# Patient Record
Sex: FEMALE | Race: BLACK OR AFRICAN AMERICAN | Employment: PART TIME | URBAN - METROPOLITAN AREA
[De-identification: names, ages, dates, MRNs, and addresses within clinical notes are randomized per-mention and may not be internally consistent; named-entity substitution may affect disease eponyms.]

---

## 2018-01-01 ENCOUNTER — HOSPITAL ENCOUNTER (EMERGENCY)
Age: 33
Discharge: HOME | End: 2018-12-26
Attending: EMERGENCY MEDICINE
Payer: COMMERCIAL

## 2018-01-01 ENCOUNTER — APPOINTMENT (OUTPATIENT)
Dept: GENERAL RADIOLOGY | Age: 33
End: 2018-01-01
Payer: COMMERCIAL

## 2018-01-01 VITALS
DIASTOLIC BLOOD PRESSURE: 98 MMHG | TEMPERATURE: 100.7 F | HEIGHT: 61 IN | SYSTOLIC BLOOD PRESSURE: 156 MMHG | RESPIRATION RATE: 20 BRPM | HEART RATE: 103 BPM | OXYGEN SATURATION: 99 % | BODY MASS INDEX: 33.61 KG/M2 | WEIGHT: 178 LBS

## 2018-01-01 DIAGNOSIS — J02.0 STREP PHARYNGITIS: Primary | ICD-10-CM

## 2018-01-01 DIAGNOSIS — J10.1 INFLUENZA A: ICD-10-CM

## 2018-01-01 LAB
ALBUMIN SERPL-MCNC: 4.2 G/DL (ref 3.5–5.2)
ALP BLD-CCNC: 80 U/L (ref 35–104)
ALT SERPL-CCNC: 13 U/L (ref 0–32)
ANION GAP SERPL CALCULATED.3IONS-SCNC: 16 MMOL/L (ref 7–16)
AST SERPL-CCNC: 18 U/L (ref 0–31)
BACTERIA: ABNORMAL /HPF
BASOPHILS ABSOLUTE: 0.01 E9/L (ref 0–0.2)
BASOPHILS RELATIVE PERCENT: 0.2 % (ref 0–2)
BILIRUB SERPL-MCNC: 0.2 MG/DL (ref 0–1.2)
BILIRUBIN URINE: ABNORMAL
BLOOD, URINE: ABNORMAL
BUN BLDV-MCNC: 6 MG/DL (ref 6–20)
CALCIUM SERPL-MCNC: 8.7 MG/DL (ref 8.6–10.2)
CHLORIDE BLD-SCNC: 94 MMOL/L (ref 98–107)
CHP ED QC CHECK: YES
CLARITY: CLEAR
CO2: 23 MMOL/L (ref 22–29)
COLOR: ABNORMAL
CREAT SERPL-MCNC: 0.7 MG/DL (ref 0.5–1)
EOSINOPHILS ABSOLUTE: 0 E9/L (ref 0.05–0.5)
EOSINOPHILS RELATIVE PERCENT: 0 % (ref 0–6)
EPITHELIAL CELLS, UA: ABNORMAL /HPF
GFR AFRICAN AMERICAN: >60
GFR NON-AFRICAN AMERICAN: >60 ML/MIN/1.73
GLUCOSE BLD-MCNC: 74 MG/DL (ref 74–99)
GLUCOSE URINE: NEGATIVE MG/DL
HCT VFR BLD CALC: 39.2 % (ref 34–48)
HEMOGLOBIN: 13.4 G/DL (ref 11.5–15.5)
IMMATURE GRANULOCYTES #: 0.02 E9/L
IMMATURE GRANULOCYTES %: 0.4 % (ref 0–5)
INFLUENZA A BY PCR: DETECTED
INFLUENZA B BY PCR: NOT DETECTED
KETONES, URINE: 40 MG/DL
LACTIC ACID: 1.2 MMOL/L (ref 0.5–2.2)
LEUKOCYTE ESTERASE, URINE: NEGATIVE
LYMPHOCYTES ABSOLUTE: 0.57 E9/L (ref 1.5–4)
LYMPHOCYTES RELATIVE PERCENT: 10.3 % (ref 20–42)
MCH RBC QN AUTO: 32.8 PG (ref 26–35)
MCHC RBC AUTO-ENTMCNC: 34.2 % (ref 32–34.5)
MCV RBC AUTO: 95.8 FL (ref 80–99.9)
MONOCYTES ABSOLUTE: 0.56 E9/L (ref 0.1–0.95)
MONOCYTES RELATIVE PERCENT: 10.1 % (ref 2–12)
NEUTROPHILS ABSOLUTE: 4.37 E9/L (ref 1.8–7.3)
NEUTROPHILS RELATIVE PERCENT: 79 % (ref 43–80)
NITRITE, URINE: NEGATIVE
PDW BLD-RTO: 13.2 FL (ref 11.5–15)
PH UA: 6.5 (ref 5–9)
PLATELET # BLD: 217 E9/L (ref 130–450)
PMV BLD AUTO: 10.9 FL (ref 7–12)
POTASSIUM SERPL-SCNC: 3.6 MMOL/L (ref 3.5–5)
PREGNANCY TEST URINE, POC: NEGATIVE
PROTEIN UA: ABNORMAL MG/DL
RBC # BLD: 4.09 E12/L (ref 3.5–5.5)
RBC # BLD: NORMAL 10*6/UL
RBC UA: ABNORMAL /HPF (ref 0–2)
SODIUM BLD-SCNC: 133 MMOL/L (ref 132–146)
SPECIFIC GRAVITY UA: >=1.03 (ref 1–1.03)
STREP GRP A PCR: POSITIVE
TOTAL PROTEIN: 7.8 G/DL (ref 6.4–8.3)
UROBILINOGEN, URINE: 1 E.U./DL
WBC # BLD: 5.5 E9/L (ref 4.5–11.5)
WBC UA: ABNORMAL /HPF (ref 0–5)

## 2018-01-01 PROCEDURE — 6370000000 HC RX 637 (ALT 250 FOR IP): Performed by: EMERGENCY MEDICINE

## 2018-01-01 PROCEDURE — 36415 COLL VENOUS BLD VENIPUNCTURE: CPT

## 2018-01-01 PROCEDURE — 6360000002 HC RX W HCPCS: Performed by: EMERGENCY MEDICINE

## 2018-01-01 PROCEDURE — 83605 ASSAY OF LACTIC ACID: CPT

## 2018-01-01 PROCEDURE — 96372 THER/PROPH/DIAG INJ SC/IM: CPT

## 2018-01-01 PROCEDURE — 87880 STREP A ASSAY W/OPTIC: CPT

## 2018-01-01 PROCEDURE — 80053 COMPREHEN METABOLIC PANEL: CPT

## 2018-01-01 PROCEDURE — 96374 THER/PROPH/DIAG INJ IV PUSH: CPT

## 2018-01-01 PROCEDURE — 85025 COMPLETE CBC W/AUTO DIFF WBC: CPT

## 2018-01-01 PROCEDURE — 87502 INFLUENZA DNA AMP PROBE: CPT

## 2018-01-01 PROCEDURE — 71046 X-RAY EXAM CHEST 2 VIEWS: CPT

## 2018-01-01 PROCEDURE — 99283 EMERGENCY DEPT VISIT LOW MDM: CPT

## 2018-01-01 PROCEDURE — 2580000003 HC RX 258: Performed by: EMERGENCY MEDICINE

## 2018-01-01 PROCEDURE — 81001 URINALYSIS AUTO W/SCOPE: CPT

## 2018-01-01 RX ORDER — 0.9 % SODIUM CHLORIDE 0.9 %
1000 INTRAVENOUS SOLUTION INTRAVENOUS ONCE
Status: COMPLETED | OUTPATIENT
Start: 2018-01-01 | End: 2018-01-01

## 2018-01-01 RX ORDER — ACETAMINOPHEN 500 MG
1000 TABLET ORAL ONCE
Status: COMPLETED | OUTPATIENT
Start: 2018-01-01 | End: 2018-01-01

## 2018-01-01 RX ORDER — OSELTAMIVIR PHOSPHATE 75 MG/1
75 CAPSULE ORAL 2 TIMES DAILY
Qty: 20 CAPSULE | Refills: 0 | Status: SHIPPED | OUTPATIENT
Start: 2018-01-01 | End: 2019-01-01

## 2018-01-01 RX ORDER — ONDANSETRON 2 MG/ML
4 INJECTION INTRAMUSCULAR; INTRAVENOUS ONCE
Status: COMPLETED | OUTPATIENT
Start: 2018-01-01 | End: 2018-01-01

## 2018-01-01 RX ADMIN — ONDANSETRON 4 MG: 2 INJECTION INTRAMUSCULAR; INTRAVENOUS at 14:05

## 2018-01-01 RX ADMIN — SODIUM CHLORIDE 1000 ML: 9 INJECTION, SOLUTION INTRAVENOUS at 14:05

## 2018-01-01 RX ADMIN — ACETAMINOPHEN 1000 MG: 500 TABLET ORAL at 14:05

## 2018-01-01 RX ADMIN — PENICILLIN G BENZATHINE 1.2 MILLION UNITS: 1200000 INJECTION, SUSPENSION INTRAMUSCULAR at 16:13

## 2018-01-01 ASSESSMENT — PAIN DESCRIPTION - LOCATION: LOCATION: ABDOMEN

## 2018-01-01 ASSESSMENT — PAIN SCALES - GENERAL
PAINLEVEL_OUTOF10: 8
PAINLEVEL_OUTOF10: 8

## 2018-01-01 ASSESSMENT — ENCOUNTER SYMPTOMS
BLOOD IN STOOL: 0
BACK PAIN: 0
ABDOMINAL PAIN: 1
SHORTNESS OF BREATH: 0
VOMITING: 1
COUGH: 1
NAUSEA: 1
DIARRHEA: 1

## 2018-01-01 ASSESSMENT — PAIN DESCRIPTION - ORIENTATION: ORIENTATION: RIGHT;LEFT;MID;LOWER

## 2018-01-01 ASSESSMENT — PAIN DESCRIPTION - DESCRIPTORS: DESCRIPTORS: SHARP

## 2018-01-01 ASSESSMENT — PAIN DESCRIPTION - PAIN TYPE: TYPE: ACUTE PAIN

## 2018-01-01 ASSESSMENT — PAIN DESCRIPTION - FREQUENCY: FREQUENCY: INTERMITTENT

## 2018-06-05 ENCOUNTER — HOSPITAL ENCOUNTER (EMERGENCY)
Age: 33
Discharge: HOME | End: 2018-06-05
Payer: COMMERCIAL

## 2018-06-05 VITALS
SYSTOLIC BLOOD PRESSURE: 136 MMHG | BODY MASS INDEX: 29.66 KG/M2 | HEART RATE: 100 BPM | RESPIRATION RATE: 16 BRPM | DIASTOLIC BLOOD PRESSURE: 95 MMHG | OXYGEN SATURATION: 99 % | WEIGHT: 178 LBS | TEMPERATURE: 97.3 F | HEIGHT: 65 IN

## 2018-06-05 DIAGNOSIS — L02.91 ABSCESS: Primary | ICD-10-CM

## 2018-06-05 PROCEDURE — 90471 IMMUNIZATION ADMIN: CPT | Performed by: NURSE PRACTITIONER

## 2018-06-05 PROCEDURE — 10060 I&D ABSCESS SIMPLE/SINGLE: CPT

## 2018-06-05 PROCEDURE — 6360000002 HC RX W HCPCS: Performed by: NURSE PRACTITIONER

## 2018-06-05 PROCEDURE — 6370000000 HC RX 637 (ALT 250 FOR IP): Performed by: NURSE PRACTITIONER

## 2018-06-05 PROCEDURE — 90715 TDAP VACCINE 7 YRS/> IM: CPT | Performed by: NURSE PRACTITIONER

## 2018-06-05 PROCEDURE — 99282 EMERGENCY DEPT VISIT SF MDM: CPT

## 2018-06-05 RX ORDER — CEPHALEXIN 500 MG/1
500 CAPSULE ORAL ONCE
Status: COMPLETED | OUTPATIENT
Start: 2018-06-05 | End: 2018-06-05

## 2018-06-05 RX ORDER — CEPHALEXIN 500 MG/1
500 CAPSULE ORAL 4 TIMES DAILY
Qty: 40 CAPSULE | Refills: 0 | Status: SHIPPED | OUTPATIENT
Start: 2018-06-05 | End: 2019-01-01 | Stop reason: ALTCHOICE

## 2018-06-05 RX ORDER — OXYCODONE HYDROCHLORIDE AND ACETAMINOPHEN 5; 325 MG/1; MG/1
1 TABLET ORAL ONCE
Status: COMPLETED | OUTPATIENT
Start: 2018-06-05 | End: 2018-06-05

## 2018-06-05 RX ADMIN — CEPHALEXIN 500 MG: 500 CAPSULE ORAL at 18:11

## 2018-06-05 RX ADMIN — OXYCODONE HYDROCHLORIDE AND ACETAMINOPHEN 1 TABLET: 5; 325 TABLET ORAL at 18:11

## 2018-06-05 RX ADMIN — TETANUS TOXOID, REDUCED DIPHTHERIA TOXOID AND ACELLULAR PERTUSSIS VACCINE, ADSORBED 0.5 ML: 5; 2.5; 8; 8; 2.5 SUSPENSION INTRAMUSCULAR at 18:43

## 2018-06-05 ASSESSMENT — PAIN SCALES - GENERAL: PAINLEVEL_OUTOF10: 8

## 2018-08-07 ENCOUNTER — HOSPITAL ENCOUNTER (OUTPATIENT)
Age: 33
Discharge: HOME | End: 2018-08-09
Payer: COMMERCIAL

## 2018-08-07 ENCOUNTER — TELEPHONE (OUTPATIENT)
Dept: OBGYN | Age: 33
End: 2018-08-07

## 2018-08-07 ENCOUNTER — OFFICE VISIT (OUTPATIENT)
Dept: OBGYN | Age: 33
End: 2018-08-07
Payer: COMMERCIAL

## 2018-08-07 VITALS
BODY MASS INDEX: 34.55 KG/M2 | HEIGHT: 61 IN | DIASTOLIC BLOOD PRESSURE: 97 MMHG | HEART RATE: 82 BPM | WEIGHT: 183 LBS | RESPIRATION RATE: 14 BRPM | SYSTOLIC BLOOD PRESSURE: 145 MMHG | TEMPERATURE: 99.4 F

## 2018-08-07 DIAGNOSIS — I10 ESSENTIAL HYPERTENSION: ICD-10-CM

## 2018-08-07 DIAGNOSIS — Z01.419 WOMEN'S ANNUAL ROUTINE GYNECOLOGICAL EXAMINATION: Primary | ICD-10-CM

## 2018-08-07 LAB
CLUE CELLS: NORMAL
CONTROL: NORMAL
CONTROL: NORMAL
PREGNANCY TEST URINE, POC: NEGATIVE
PREGNANCY TEST URINE, POC: NORMAL
SOURCE WET PREP: NORMAL
TRICHOMONAS PREP: NORMAL
YEAST WET PREP: NORMAL

## 2018-08-07 PROCEDURE — 87491 CHLMYD TRACH DNA AMP PROBE: CPT

## 2018-08-07 PROCEDURE — 4004F PT TOBACCO SCREEN RCVD TLK: CPT | Performed by: NURSE PRACTITIONER

## 2018-08-07 PROCEDURE — 87210 SMEAR WET MOUNT SALINE/INK: CPT

## 2018-08-07 PROCEDURE — 99395 PREV VISIT EST AGE 18-39: CPT | Performed by: NURSE PRACTITIONER

## 2018-08-07 PROCEDURE — G8427 DOCREV CUR MEDS BY ELIG CLIN: HCPCS | Performed by: NURSE PRACTITIONER

## 2018-08-07 PROCEDURE — 81025 URINE PREGNANCY TEST: CPT | Performed by: NURSE PRACTITIONER

## 2018-08-07 PROCEDURE — 87591 N.GONORRHOEAE DNA AMP PROB: CPT

## 2018-08-07 PROCEDURE — G8417 CALC BMI ABV UP PARAM F/U: HCPCS | Performed by: NURSE PRACTITIONER

## 2018-08-07 PROCEDURE — 99203 OFFICE O/P NEW LOW 30 MIN: CPT | Performed by: NURSE PRACTITIONER

## 2018-08-07 ASSESSMENT — ENCOUNTER SYMPTOMS
RESPIRATORY NEGATIVE: 1
ABDOMINAL PAIN: 0
ALLERGIC/IMMUNOLOGIC NEGATIVE: 1
ABDOMINAL DISTENTION: 1

## 2018-08-07 NOTE — TELEPHONE ENCOUNTER
Call to remind patient to get lab work done to confirm pregnancy . Call woman's care when it is done so results can be followed up. Also need to make appointment in the medical clinic.

## 2018-08-07 NOTE — PROGRESS NOTES
Examined by Job Bazan CNP--Pelvic exam done=--Cultures obtained--identified and prepared for transportation to the laboratory--Tolerated well--Advised the need for IM Clinic referral as blood pressure is slightly elevated--To maintain well woman healht

## 2018-08-10 LAB
CHLAMYDIA TRACHOMATIS AMPLIFIED DET: NORMAL
N GONORRHOEAE AMPLIFIED DET: NORMAL

## 2018-08-21 ENCOUNTER — TELEPHONE (OUTPATIENT)
Dept: OBGYN | Age: 33
End: 2018-08-21

## 2018-12-26 NOTE — ED PROVIDER NOTES
Riddhi Galaviz is a 28 y.o female who presents to the ED with a complaint of cough. Patient states for the past 3 days she has been having a productive cough, congestion. She states she has also been having abdominal pain, nausea, vomiting and diarrhea which started around the same time. She states she only has abdominal pain when she has been coughing for a prolonged period of time and describes it as an aching sensation. She states that her niece has similar symptoms and she was admitted yesterday for pneumonia. She has been feeling feverish and chills. She denies any dysuria, urinary frequency, chest pain or shortness of breath. She states that her throat has also been sore but has not had any difficulty swallowing. Cough   Cough characteristics:  Productive  Sputum characteristics:  Nondescript  Severity:  Moderate  Onset quality:  Gradual  Duration:  3 days  Timing:  Intermittent  Progression:  Unchanged  Chronicity:  New  Relieved by:  Nothing  Worsened by:  Nothing  Associated symptoms: chills and fever    Associated symptoms: no chest pain, no headaches, no rash and no shortness of breath        Review of Systems   Constitutional: Positive for chills and fever. HENT: Positive for congestion. Respiratory: Positive for cough. Negative for shortness of breath. Cardiovascular: Negative for chest pain. Gastrointestinal: Positive for abdominal pain, diarrhea, nausea and vomiting. Negative for blood in stool. Genitourinary: Negative for dysuria and frequency. Musculoskeletal: Negative for back pain. Skin: Negative for rash. Neurological: Negative for weakness and headaches. All other systems reviewed and are negative. Physical Exam   Constitutional: She is oriented to person, place, and time. She appears well-developed and well-nourished. HENT:   Head: Normocephalic and atraumatic.    Oral pharyngeal erythema, mild edema, uvula is midline with no evidence of peritonsillar abscess

## 2019-01-01 ENCOUNTER — OFFICE VISIT (OUTPATIENT)
Dept: INTERNAL MEDICINE | Age: 34
End: 2019-01-01
Payer: COMMERCIAL

## 2019-01-01 ENCOUNTER — OFFICE VISIT (OUTPATIENT)
Dept: OBGYN | Age: 34
End: 2019-01-01
Payer: COMMERCIAL

## 2019-01-01 ENCOUNTER — HOSPITAL ENCOUNTER (OUTPATIENT)
Dept: ULTRASOUND IMAGING | Age: 34
Discharge: HOME | End: 2019-04-16
Payer: COMMERCIAL

## 2019-01-01 ENCOUNTER — HOSPITAL ENCOUNTER (OUTPATIENT)
Age: 34
Discharge: HOME | End: 2019-04-11
Payer: COMMERCIAL

## 2019-01-01 ENCOUNTER — HOSPITAL ENCOUNTER (EMERGENCY)
Age: 34
End: 2019-11-02
Attending: EMERGENCY MEDICINE

## 2019-01-01 ENCOUNTER — TELEPHONE (OUTPATIENT)
Dept: INTERNAL MEDICINE | Age: 34
End: 2019-01-01

## 2019-01-01 VITALS
HEART RATE: 96 BPM | HEIGHT: 61 IN | BODY MASS INDEX: 38.33 KG/M2 | SYSTOLIC BLOOD PRESSURE: 127 MMHG | RESPIRATION RATE: 14 BRPM | WEIGHT: 203 LBS | TEMPERATURE: 98 F | DIASTOLIC BLOOD PRESSURE: 81 MMHG

## 2019-01-01 VITALS
WEIGHT: 201 LBS | SYSTOLIC BLOOD PRESSURE: 136 MMHG | OXYGEN SATURATION: 100 % | HEART RATE: 90 BPM | DIASTOLIC BLOOD PRESSURE: 83 MMHG | HEIGHT: 61 IN | RESPIRATION RATE: 17 BRPM | BODY MASS INDEX: 37.95 KG/M2

## 2019-01-01 VITALS
HEART RATE: 87 BPM | TEMPERATURE: 97.9 F | BODY MASS INDEX: 36.99 KG/M2 | WEIGHT: 195.9 LBS | HEIGHT: 61 IN | SYSTOLIC BLOOD PRESSURE: 133 MMHG | RESPIRATION RATE: 16 BRPM | DIASTOLIC BLOOD PRESSURE: 80 MMHG

## 2019-01-01 DIAGNOSIS — F31.30 BIPOLAR AFFECTIVE DISORDER, CURRENT EPISODE DEPRESSED, CURRENT EPISODE SEVERITY UNSPECIFIED (HCC): ICD-10-CM

## 2019-01-01 DIAGNOSIS — Z20.2 POSSIBLE EXPOSURE TO STD: ICD-10-CM

## 2019-01-01 DIAGNOSIS — R60.0 EDEMA OF BOTH LOWER EXTREMITIES: ICD-10-CM

## 2019-01-01 DIAGNOSIS — M79.89 SWELLING OF BOTH LOWER EXTREMITIES: ICD-10-CM

## 2019-01-01 DIAGNOSIS — R06.02 SHORTNESS OF BREATH: Primary | ICD-10-CM

## 2019-01-01 DIAGNOSIS — N94.6 DYSMENORRHEA: ICD-10-CM

## 2019-01-01 DIAGNOSIS — I46.9 CARDIAC ARREST (HCC): Primary | ICD-10-CM

## 2019-01-01 DIAGNOSIS — R60.9 DEPENDENT EDEMA: ICD-10-CM

## 2019-01-01 DIAGNOSIS — Z23 NEED FOR VACCINATION FOR STREP PNEUMONIAE: ICD-10-CM

## 2019-01-01 DIAGNOSIS — Z09 FOLLOW UP: Primary | ICD-10-CM

## 2019-01-01 DIAGNOSIS — Z20.2 POSSIBLE EXPOSURE TO STD: Primary | ICD-10-CM

## 2019-01-01 LAB
ALBUMIN SERPL-MCNC: 3.8 G/DL (ref 3.5–5.2)
ALP BLD-CCNC: 84 U/L (ref 35–104)
ALT SERPL-CCNC: 12 U/L (ref 0–32)
ANION GAP SERPL CALCULATED.3IONS-SCNC: 10 MMOL/L (ref 7–16)
AST SERPL-CCNC: 14 U/L (ref 0–31)
BILIRUB SERPL-MCNC: <0.2 MG/DL (ref 0–1.2)
BUN BLDV-MCNC: 7 MG/DL (ref 6–20)
CALCIUM SERPL-MCNC: 8.6 MG/DL (ref 8.6–10.2)
CHLAMYDIA TRACHOMATIS AMPLIFIED DET: NORMAL
CHLORIDE BLD-SCNC: 102 MMOL/L (ref 98–107)
CLUE CELLS: NORMAL
CO2: 26 MMOL/L (ref 22–29)
CREAT SERPL-MCNC: 0.7 MG/DL (ref 0.5–1)
GFR AFRICAN AMERICAN: >60
GFR NON-AFRICAN AMERICAN: >60 ML/MIN/1.73
GLUCOSE BLD-MCNC: 78 MG/DL (ref 74–99)
GONADOTROPIN, CHORIONIC (HCG) QUANT: 0.2 MIU/ML
N GONORRHOEAE AMPLIFIED DET: NORMAL
POTASSIUM SERPL-SCNC: 4 MMOL/L (ref 3.5–5)
SODIUM BLD-SCNC: 138 MMOL/L (ref 132–146)
SOURCE WET PREP: NORMAL
TOTAL PROTEIN: 7.3 G/DL (ref 6.4–8.3)
TRICHOMONAS PREP: NORMAL
YEAST WET PREP: NORMAL

## 2019-01-01 PROCEDURE — 84702 CHORIONIC GONADOTROPIN TEST: CPT

## 2019-01-01 PROCEDURE — 2500000003 HC RX 250 WO HCPCS

## 2019-01-01 PROCEDURE — 99213 OFFICE O/P EST LOW 20 MIN: CPT | Performed by: INTERNAL MEDICINE

## 2019-01-01 PROCEDURE — 2580000003 HC RX 258

## 2019-01-01 PROCEDURE — 87210 SMEAR WET MOUNT SALINE/INK: CPT

## 2019-01-01 PROCEDURE — 6360000002 HC RX W HCPCS: Performed by: EMERGENCY MEDICINE

## 2019-01-01 PROCEDURE — G8427 DOCREV CUR MEDS BY ELIG CLIN: HCPCS | Performed by: NURSE PRACTITIONER

## 2019-01-01 PROCEDURE — G8417 CALC BMI ABV UP PARAM F/U: HCPCS | Performed by: NURSE PRACTITIONER

## 2019-01-01 PROCEDURE — 6360000002 HC RX W HCPCS

## 2019-01-01 PROCEDURE — 36415 COLL VENOUS BLD VENIPUNCTURE: CPT

## 2019-01-01 PROCEDURE — 90732 PPSV23 VACC 2 YRS+ SUBQ/IM: CPT

## 2019-01-01 PROCEDURE — 87491 CHLMYD TRACH DNA AMP PROBE: CPT

## 2019-01-01 PROCEDURE — 99213 OFFICE O/P EST LOW 20 MIN: CPT | Performed by: NURSE PRACTITIONER

## 2019-01-01 PROCEDURE — 31500 INSERT EMERGENCY AIRWAY: CPT

## 2019-01-01 PROCEDURE — 4004F PT TOBACCO SCREEN RCVD TLK: CPT | Performed by: INTERNAL MEDICINE

## 2019-01-01 PROCEDURE — G0009 ADMIN PNEUMOCOCCAL VACCINE: HCPCS

## 2019-01-01 PROCEDURE — 4004F PT TOBACCO SCREEN RCVD TLK: CPT | Performed by: NURSE PRACTITIONER

## 2019-01-01 PROCEDURE — 76830 TRANSVAGINAL US NON-OB: CPT

## 2019-01-01 PROCEDURE — 80053 COMPREHEN METABOLIC PANEL: CPT

## 2019-01-01 PROCEDURE — 99214 OFFICE O/P EST MOD 30 MIN: CPT | Performed by: INTERNAL MEDICINE

## 2019-01-01 PROCEDURE — 96160 PT-FOCUSED HLTH RISK ASSMT: CPT | Performed by: INTERNAL MEDICINE

## 2019-01-01 PROCEDURE — 99212 OFFICE O/P EST SF 10 MIN: CPT | Performed by: NURSE PRACTITIONER

## 2019-01-01 PROCEDURE — 87591 N.GONORRHOEAE DNA AMP PROB: CPT

## 2019-01-01 PROCEDURE — 92950 HEART/LUNG RESUSCITATION CPR: CPT

## 2019-01-01 PROCEDURE — 99285 EMERGENCY DEPT VISIT HI MDM: CPT

## 2019-01-01 PROCEDURE — G8417 CALC BMI ABV UP PARAM F/U: HCPCS | Performed by: INTERNAL MEDICINE

## 2019-01-01 PROCEDURE — G8427 DOCREV CUR MEDS BY ELIG CLIN: HCPCS | Performed by: INTERNAL MEDICINE

## 2019-01-01 RX ORDER — ALBUTEROL SULFATE 90 UG/1
2 AEROSOL, METERED RESPIRATORY (INHALATION) 4 TIMES DAILY PRN
Qty: 1 INHALER | Refills: 5 | Status: SHIPPED | OUTPATIENT
Start: 2019-01-01

## 2019-01-01 RX ADMIN — EPINEPHRINE 1 MG: 0.1 INJECTION, SOLUTION ENDOTRACHEAL; INTRACARDIAC; INTRAVENOUS at 00:46

## 2019-01-01 RX ADMIN — EPINEPHRINE 1 MG: 0.1 INJECTION, SOLUTION ENDOTRACHEAL; INTRACARDIAC; INTRAVENOUS at 00:42

## 2019-01-01 ASSESSMENT — ENCOUNTER SYMPTOMS
SHORTNESS OF BREATH: 1
GASTROINTESTINAL NEGATIVE: 1
NAUSEA: 0
DIARRHEA: 0
VOMITING: 0
SHORTNESS OF BREATH: 0
CHEST TIGHTNESS: 1
ABDOMINAL PAIN: 0
WHEEZING: 1
COUGH: 0

## 2019-01-01 ASSESSMENT — PATIENT HEALTH QUESTIONNAIRE - PHQ9
4. FEELING TIRED OR HAVING LITTLE ENERGY: 3
1. LITTLE INTEREST OR PLEASURE IN DOING THINGS: 3
10. IF YOU CHECKED OFF ANY PROBLEMS, HOW DIFFICULT HAVE THESE PROBLEMS MADE IT FOR YOU TO DO YOUR WORK, TAKE CARE OF THINGS AT HOME, OR GET ALONG WITH OTHER PEOPLE: 2
2. FEELING DOWN, DEPRESSED OR HOPELESS: 3
5. POOR APPETITE OR OVEREATING: 3
8. MOVING OR SPEAKING SO SLOWLY THAT OTHER PEOPLE COULD HAVE NOTICED. OR THE OPPOSITE, BEING SO FIGETY OR RESTLESS THAT YOU HAVE BEEN MOVING AROUND A LOT MORE THAN USUAL: 3
7. TROUBLE CONCENTRATING ON THINGS, SUCH AS READING THE NEWSPAPER OR WATCHING TELEVISION: 1
3. TROUBLE FALLING OR STAYING ASLEEP: 3
SUM OF ALL RESPONSES TO PHQ QUESTIONS 1-9: 20
SUM OF ALL RESPONSES TO PHQ9 QUESTIONS 1 & 2: 6
SUM OF ALL RESPONSES TO PHQ QUESTIONS 1-9: 20
9. THOUGHTS THAT YOU WOULD BE BETTER OFF DEAD, OR OF HURTING YOURSELF: 1
6. FEELING BAD ABOUT YOURSELF - OR THAT YOU ARE A FAILURE OR HAVE LET YOURSELF OR YOUR FAMILY DOWN: 0

## 2019-04-09 NOTE — PROGRESS NOTES
Patient here today for STI check. Specimens collected and sent to the lab for results, patient tolerated well. Patient seen and discharged by Carri Méndez.

## 2019-04-09 NOTE — PROGRESS NOTES
Subjective:      Patient ID: Kala Montez is a 27 y.o. female. HPI: Darrius Sheriff is a 27 y.o AA female with a PMHx of     Who presents today due to possible STD exposure. Patient reports that she has had only one male sexual partner but recently learned her partner has had sexual relations with other partner(s). Denies any fever, N/V/D, chills, or any genital symptoms. Admits t painless swelling in both lower extremities since 4/6/19, which she had once before in the past last year. Last pap: 2 years ago  LMP: Dec 2018, no intermittent bleeding or spotting since; prior to this, periods were regular. Patient states she has been under a lot of stress in the past 6 months       Review of Systems   Constitutional: Positive for unexpected weight change. Negative for chills, fatigue and fever. Respiratory: Negative for cough and shortness of breath. Cardiovascular: Positive for leg swelling. Negative for chest pain and palpitations. Gastrointestinal: Negative for abdominal pain, diarrhea, nausea and vomiting. Endocrine: Positive for polydipsia and polyuria. Genitourinary: Positive for menstrual problem. Negative for dyspareunia, dysuria, genital sores, hematuria and vaginal discharge. Objective:   Physical Exam   Constitutional: She is oriented to person, place, and time. She appears well-developed and well-nourished. Cardiovascular: Normal rate, regular rhythm and normal heart sounds. Pulmonary/Chest: Breath sounds normal.   Abdominal: Soft. Bowel sounds are normal.   Genitourinary: Vagina normal.   Musculoskeletal: She exhibits edema. She exhibits no tenderness. Neurological: She is alert and oriented to person, place, and time. Skin: Skin is warm and dry. Assessment:        Diagnosis Orders   1. Possible exposure to STD  WET PREP, GENITAL    HCG, QUANTITATIVE, PREGNANCY    US NON OB TRANSVAGINAL    C. Trachomatis / N. Gonorrhoeae, DNA Probe   2. Dysmenorrhea     3.  Edema of both lower extremities  Comprehensive Metabolic Panel           Plan:      1. Patient scheduled for TVUS and STD testing.   - Will call patient if any screening results come back positive. - Instructed patient to follow up with our office if anything changes symptoms-wise. 2. Patient referral to Internal Medicine   - D/t non-gynecological complaints and establishment of PCP  Further evaluation and management will be dependent on her clinical presentation and the results of her testing     I requested the patient return for a follow-up assessment in 6 weeks unless there is a clinical reason for her to return prior to that time. All questions and concerns addressed. Patient voices understanding of plan of care.           Aminata Paez, MAXI - CNP

## 2019-05-21 NOTE — PROGRESS NOTES
Subjective:      Patient ID: Armando Pham is a 27 y.o. female. Jo Ann Vora is a 27 y.o AA female with a PMHx of STD   Who presents today  For follow up due to possible STD exposure. Patient reports that she has had only one male sexual partner but recently learned her partner has had sexual relations with other partner(s). She denies intermenstrual bleeding, pelvic pain, irregular or excessive bleeding, vaginal discharge, itchiness or lesions on external genitaliaDenies any fever, N/V/D, chills, or any genital symptoms. Admits t painless swelling in both lower extremities since 4/6/19, which she had once before in the past last year  She has no complaints or report signs that could indicate a vaginal infection.      Last pap smear: 2016  LMP:04/01/2019        Review of Systems   Cardiovascular: Negative. Gastrointestinal: Negative. Genitourinary: Negative. Psychiatric/Behavioral: Negative. Objective:   Physical Exam   Constitutional: She is oriented to person, place, and time. She appears well-developed and well-nourished. Cardiovascular: Normal rate, regular rhythm and normal heart sounds. Pulmonary/Chest: Effort normal and breath sounds normal.   Abdominal: Soft. Bowel sounds are normal.   Neurological: She is alert and oriented to person, place, and time. Skin: Skin is warm and dry. Psychiatric: She has a normal mood and affect. Nursing note and vitals reviewed. Assessment:       Diagnosis Orders   1. Follow up     2. Swelling of both lower extremities  Skyler Meneses MD, Internal Medicine, [address] [place] [address] Road:     The patient was reminded of the importance of safe sexual practices including a mutually monogamous relationship and the use of barrier contraception  She will follow up with her annual visit if no other issues arise. Recommended her to come to the clinic if she experiences any acute concerns or complaints.    Patient  referred to IM for swelling of legs. She verbalized understanding and agreement to plan of care.          Ray Ceja, MAXI - CNP

## 2019-06-26 NOTE — TELEPHONE ENCOUNTER
Reached out to patient for Pre - Charting purposes. Spoke with patient and [facility] Flow Sheet was completed.

## 2019-07-03 NOTE — PROGRESS NOTES
Chief Complaint   Patient presents with   [address] [facility] [address] Asthma     has been getting worse over the last few months    Swelling     bilat feet and ankle    Immunizations     agrees to Pneumonia vaccine       History of Present Illness:   28 yo female here for new patient appt. 1. C/o dyspnea. Ran out of her inhaler \"awhile ago. \" States it has been worse lately. Diagnosed with asthma as a child but has not undergone additional testing. Of note, she smokes 1 ppd for the past 20 years. 2. C/o leg swelling \"at least once a month. \"  Sometimes it happens when she is not even on her feet. States she gets short of breath when her legs are swollen. Later states it happens when she does eat out like at [employer]. 3. Gained weight but states appetite is decreased. 195# from 201# in 1 month. She does allude to the fact that she has been under a lot of stress lately. Review of Systems   Constitutional:        \"Night sweats\"   HENT: Negative. Respiratory: Positive for chest tightness, shortness of breath and wheezing. Cardiovascular: Positive for palpitations and leg swelling. Endocrine: Negative. Genitourinary: Negative. Neurological: Positive for light-headedness and headaches. Psychiatric/Behavioral: Positive for dysphoric mood. Past Medical History:   Diagnosis Date    Asthma     Bipolar disorder ([facility] [address]     Chronic back pain     Depression     Hypertension      No past surgical history on file.   Family History   Problem Relation Age of Onset    High Blood Pressure Mother     Cancer Father    pancreatic CA  Social History     Socioeconomic History    Marital status: Single     Spouse name: Not on file    Number of children: Not on file    Years of education: Not on file    Highest education level: Not on file   Occupational History    Not on file   Social Needs    Financial resource strain: Not on file    Food insecurity:     Worry: Not on file     Inability:

## 2019-07-03 NOTE — PROGRESS NOTES
Discharge instructions reviewed with patient per Dr. Elyssa Yates. Pt given pneumonia 23 vaccine. Follow up appointment scheduled. Pt directed to  to  follow up appointment.